# Patient Record
Sex: FEMALE | Race: OTHER
[De-identification: names, ages, dates, MRNs, and addresses within clinical notes are randomized per-mention and may not be internally consistent; named-entity substitution may affect disease eponyms.]

---

## 2020-10-10 ENCOUNTER — HOSPITAL ENCOUNTER (INPATIENT)
Dept: HOSPITAL 1 - ED | Age: 35
LOS: 4 days | Discharge: HOME | DRG: 263 | End: 2020-10-14
Attending: HOSPITALIST | Admitting: HOSPITALIST
Payer: COMMERCIAL

## 2020-10-10 VITALS
HEIGHT: 62 IN | WEIGHT: 230 LBS | HEIGHT: 62 IN | BODY MASS INDEX: 42.33 KG/M2 | BODY MASS INDEX: 42.33 KG/M2 | WEIGHT: 230 LBS

## 2020-10-10 DIAGNOSIS — Z98.891: ICD-10-CM

## 2020-10-10 DIAGNOSIS — Z20.828: ICD-10-CM

## 2020-10-10 DIAGNOSIS — E66.9: ICD-10-CM

## 2020-10-10 DIAGNOSIS — K80.00: Primary | ICD-10-CM

## 2020-10-10 LAB
ALBUMIN SERPL-MCNC: 3.3 G/DL (ref 3.4–5)
ALP SERPL-CCNC: 68 U/L (ref 46–116)
ALT SERPL-CCNC: 26 U/L (ref 14–59)
AST SERPL-CCNC: 12 U/L (ref 15–37)
BASOPHILS NFR BLD: 0.4 % (ref 0–2)
BILIRUB SERPL-MCNC: 0.17 MG/DL (ref 0.2–1)
BUN SERPL-MCNC: 13 MG/DL (ref 7–18)
CALCIUM SERPL-MCNC: 8.6 MG/DL (ref 8.5–10.1)
CHLORIDE SERPL-SCNC: 103 MMOL/L (ref 98–107)
CO2 SERPL-SCNC: 26.4 MMOL/L (ref 21–32)
CREAT SERPL-MCNC: 0.7 MG/DL (ref 0.6–1)
ERYTHROCYTE [DISTWIDTH] IN BLOOD BY AUTOMATED COUNT: 13 % (ref 11.5–14.5)
GFR SERPLBLD BASED ON 1.73 SQ M-ARVRAT: > 60 ML/MIN
GLUCOSE SERPL-MCNC: 111 MG/DL (ref 74–106)
LIPASE SERPL-CCNC: 136 IU/L (ref 73–393)
PLATELET # BLD: 235 X10^3MCL (ref 130–400)
POTASSIUM SERPL-SCNC: 4.2 MMOL/L (ref 3.5–5.1)
PROT SERPL-MCNC: 6.8 G/DL (ref 6.4–8.2)
SODIUM SERPL-SCNC: 137 MMOL/L (ref 136–145)

## 2020-10-10 PROCEDURE — A9537 TC99M MEBROFENIN: HCPCS

## 2020-10-10 PROCEDURE — G0378 HOSPITAL OBSERVATION PER HR: HCPCS

## 2020-10-10 NOTE — NUR
PT LAYING SUPINE IN GURNEY IN A POSITION OF COMFORT. IV PLACED IN RAC.
ULTRASOUND AT BEDSIDE. PT AAO X4 RESPIRATIONS E/U NO DISTRESS NOTED.

## 2020-10-10 NOTE — NUR
PT CAME IN WITH A C/C OF RIGHT UPPER QUADRANT PAIN X4 DAYS. PT REPORTS SHE WAS
DIAGNOSED WITH GALL STONES APROX 4 YEARS AGO. PT STATES SHE REFUSED SURGERY
THAT WAS RECOMMENDED AND STATES SHE HAS BEEN TRYING TO MANAGE IN OTHER WAYS. PT
STATES SHE IS USUALLY ABLE TO MANAGE [employer] BUT HAS NOT BEEN ABLE TO THIS
TIME. PT STATES NAUSEA BUT NO VOMITING. PT STATES NO DIARRHEA OR CONSTIPATION.
PT STATES NO PAIN UPON PALPATION. PT AAO X4 RESPIRATIONS E/U NO DISTRESS NOTED.

## 2020-10-11 VITALS — SYSTOLIC BLOOD PRESSURE: 110 MMHG | DIASTOLIC BLOOD PRESSURE: 56 MMHG

## 2020-10-11 VITALS — SYSTOLIC BLOOD PRESSURE: 111 MMHG | DIASTOLIC BLOOD PRESSURE: 70 MMHG

## 2020-10-11 VITALS — DIASTOLIC BLOOD PRESSURE: 48 MMHG | SYSTOLIC BLOOD PRESSURE: 107 MMHG

## 2020-10-11 VITALS — SYSTOLIC BLOOD PRESSURE: 133 MMHG | DIASTOLIC BLOOD PRESSURE: 76 MMHG

## 2020-10-11 VITALS — SYSTOLIC BLOOD PRESSURE: 127 MMHG | DIASTOLIC BLOOD PRESSURE: 48 MMHG

## 2020-10-11 VITALS — DIASTOLIC BLOOD PRESSURE: 49 MMHG | SYSTOLIC BLOOD PRESSURE: 112 MMHG

## 2020-10-11 LAB
AMYLASE SERPL-CCNC: 58 U/L (ref 25–115)
BASOPHILS NFR BLD: 0.5 % (ref 0–2)
BUN SERPL-MCNC: 10 MG/DL (ref 7–18)
CALCIUM SERPL-MCNC: 8.5 MG/DL (ref 8.5–10.1)
CHLORIDE SERPL-SCNC: 105 MMOL/L (ref 98–107)
CHOLEST SERPL-MCNC: 159 MG/DL (ref ?–200)
CHOLEST/HDLC SERPL: 3.8 MG/DL
CO2 SERPL-SCNC: 24.3 MMOL/L (ref 21–32)
CREAT SERPL-MCNC: 0.6 MG/DL (ref 0.6–1)
ERYTHROCYTE [DISTWIDTH] IN BLOOD BY AUTOMATED COUNT: 13.4 % (ref 11.5–14.5)
GFR SERPLBLD BASED ON 1.73 SQ M-ARVRAT: > 60 ML/MIN
GLUCOSE SERPL-MCNC: 90 MG/DL (ref 74–106)
HDLC SERPL-MCNC: 42 MG/DL (ref 40–60)
MAGNESIUM SERPL-MCNC: 1.9 MG/DL (ref 1.8–2.4)
MAGNESIUM SERPL-MCNC: 2 MG/DL (ref 1.8–2.4)
PHOSPHATE SERPL-MCNC: 3.6 MG/DL (ref 2.5–4.9)
PHOSPHATE SERPL-MCNC: 4.1 MG/DL (ref 2.5–4.9)
PLATELET # BLD: 217 X10^3MCL (ref 130–400)
POTASSIUM SERPL-SCNC: 4.1 MMOL/L (ref 3.5–5.1)
SODIUM SERPL-SCNC: 137 MMOL/L (ref 136–145)
T3 SERPL-MCNC: 0.8 NG/ML
T3RU NFR SERPL: 33 % UPTAKE (ref 30–39)
T4 FREE SERPL-MCNC: 1.06 NG/DL (ref 0.76–1.46)
T4 SERPL-MCNC: 8.5 UG/DL (ref 4.7–13.3)
T4/T3 UPTAKE INDEX SERPL: 2.8 UG/DL (ref 1.4–4.5)
TRIGL SERPL-MCNC: 133 MG/DL (ref ?–150)

## 2020-10-11 NOTE — NUR
PATIENT HAS BEEN NPO, CALLED NM AND PATIENT IS SCHEDULE FOR HIDA SCAN AT ABOUT
[id number], HCG BLOOD TEST STAT ORDERED, WAITING FOR RESULTS. PATIENT DENIES
ANY ABDOMINAL PAIN AT THIS TIME. CONTINUE IV  NS 100ML/HR. WILL CONTINUE TO
MONITOR PATIENT.

## 2020-10-11 NOTE — NUR
A/A/O X4. DENIES DIZZINESS C/O HEADACHE RADIATING TO THE JAW. DENIES NEED FOR
PAIN MEDICATION THUS FAR. BREATH SOUNDS CLEAR. BREATHING EVEN AND UNLABORED ON
ROOM AIR. DENIES CHEST PAIN AND PRESSURE. BOWEL SOUNDS ACTIVE. NO C/O N/V AND
ABD PAIN THUS FAR. IV INTACT ON THE RAC INFUSING WITH NS  ML/HR. MADE PT
COMFORTABLE. PLACED CALL LIGHT WITH IN REACH. WILL CONTINUE TO MONITOR.

## 2020-10-11 NOTE — NUR
PT AAO X4 RESPIRATIONS E/U NO DISTRESS NOTED. PT SEEN BY ADMITTING MD WHO SPOKE
WITH PT ABOUT POC. PT REPORTS PAIN IS BETTER JUST "ANNOYING."

## 2020-10-11 NOTE — NUR
PATIENT CAME BACK FROM HIDA SCAN AT 1740. CONTINUING IV NS INFUSIONG. PATIENT
WANTED TO EAT. PAGED DR. ALBERT AND PATIENT STILL NEEDS TO BE NPO.
EXPLAINS TO PATIENT ABOUT THE REASONS, BUT PATIENT
IS STILL NOT TOO HAPPY ABOUT IT.

## 2020-10-12 VITALS — SYSTOLIC BLOOD PRESSURE: 123 MMHG | DIASTOLIC BLOOD PRESSURE: 57 MMHG

## 2020-10-12 VITALS — SYSTOLIC BLOOD PRESSURE: 97 MMHG | DIASTOLIC BLOOD PRESSURE: 41 MMHG

## 2020-10-12 VITALS — SYSTOLIC BLOOD PRESSURE: 113 MMHG | DIASTOLIC BLOOD PRESSURE: 65 MMHG

## 2020-10-12 VITALS — DIASTOLIC BLOOD PRESSURE: 47 MMHG | SYSTOLIC BLOOD PRESSURE: 96 MMHG

## 2020-10-12 VITALS — SYSTOLIC BLOOD PRESSURE: 101 MMHG | DIASTOLIC BLOOD PRESSURE: 53 MMHG

## 2020-10-12 LAB
BASOPHILS NFR BLD: 0.4 % (ref 0–2)
BUN SERPL-MCNC: 8 MG/DL (ref 7–18)
CALCIUM SERPL-MCNC: 7.6 MG/DL (ref 8.5–10.1)
CHLORIDE SERPL-SCNC: 108 MMOL/L (ref 98–107)
CO2 SERPL-SCNC: 24.3 MMOL/L (ref 21–32)
CREAT SERPL-MCNC: 0.6 MG/DL (ref 0.6–1)
ERYTHROCYTE [DISTWIDTH] IN BLOOD BY AUTOMATED COUNT: 13.4 % (ref 11.5–14.5)
GFR SERPLBLD BASED ON 1.73 SQ M-ARVRAT: > 60 ML/MIN
GLUCOSE SERPL-MCNC: 84 MG/DL (ref 74–106)
MAGNESIUM SERPL-MCNC: 2.1 MG/DL (ref 1.8–2.4)
PHOSPHATE SERPL-MCNC: 3.2 MG/DL (ref 2.5–4.9)
PLATELET # BLD: 209 X10^3MCL (ref 130–400)
POTASSIUM SERPL-SCNC: 3.9 MMOL/L (ref 3.5–5.1)
SODIUM SERPL-SCNC: 140 MMOL/L (ref 136–145)

## 2020-10-12 NOTE — NUR
AOX4
DENIES PAIN/DISCOMFORT AT THIS TIME
LAYING IN BED COMFORTABLY
CALL LIGHT WITHIN REACH
WILL CONTINUE TO MONITOR PT

## 2020-10-12 NOTE — NUR
PT C/O HAVING PROBLEM SLEEPING. DR. NOVA NOTIFIED, DOCTOR ORDERED AMBIEN PO.
CARRIED OUT ORDER. GAVE PT AMBIEN PO. PT TOLERATED IT WELL. WILL CONTINUE TO
MONITOR.

## 2020-10-12 NOTE — NUR
PATIENT WAS NPO FOR THE MORNING AND AS SOON AS PATIENT FOUND OUT SHE WAS NOT
GOING TO HAVE SURGERY TODAY, PATIENT STARTED TO EAT FULL LIQUID DIET AT NOON.
PATIENT TOLERATED THE MEALS WELL, DENIES ANY STOMACH PAIN AND REFUSED ANY
NARCOTIC. LAP DEEDEE PROCEDURE CONSENT FORM SIGNED. CONTINUING IV NS INFUSION
100 ML/HR. WILL CONTINUE TO MONITOR PATIENT.

## 2020-10-12 NOTE — NUR
PT QUIET AND RESTING. NO C/O ABDOMINAL PAIN THUS FAR. IV INTACT AND INFUSING
AS ORDERED. MADE PT COMOFRTABLE. WILL ENDORSE TO THE AM NURSE ACCORDINGLY.

## 2020-10-12 NOTE — NUR
CALLED OR AND PATIENT WAS SCHEDULED FOR LAP DEEDEE TOMORROW AT 0830. PATIENT
UPDATED ON THE PLAN OF CARE. DIET CHANGED TO FULL LIQUID NOW, WILL KEEP NPO
AFTER MIDNIGHT.

## 2020-10-13 VITALS — SYSTOLIC BLOOD PRESSURE: 112 MMHG | DIASTOLIC BLOOD PRESSURE: 65 MMHG

## 2020-10-13 VITALS — SYSTOLIC BLOOD PRESSURE: 124 MMHG | DIASTOLIC BLOOD PRESSURE: 68 MMHG

## 2020-10-13 VITALS — DIASTOLIC BLOOD PRESSURE: 65 MMHG | SYSTOLIC BLOOD PRESSURE: 109 MMHG

## 2020-10-13 VITALS — SYSTOLIC BLOOD PRESSURE: 111 MMHG | DIASTOLIC BLOOD PRESSURE: 64 MMHG

## 2020-10-13 VITALS — DIASTOLIC BLOOD PRESSURE: 71 MMHG | SYSTOLIC BLOOD PRESSURE: 119 MMHG

## 2020-10-13 LAB
BASOPHILS NFR BLD: 0.4 % (ref 0–2)
BUN SERPL-MCNC: 5 MG/DL (ref 7–18)
CALCIUM SERPL-MCNC: 8.7 MG/DL (ref 8.5–10.1)
CHLORIDE SERPL-SCNC: 108 MMOL/L (ref 98–107)
CO2 SERPL-SCNC: 25.5 MMOL/L (ref 21–32)
CREAT SERPL-MCNC: 0.7 MG/DL (ref 0.6–1)
ERYTHROCYTE [DISTWIDTH] IN BLOOD BY AUTOMATED COUNT: 13.2 % (ref 11.5–14.5)
GFR SERPLBLD BASED ON 1.73 SQ M-ARVRAT: > 60 ML/MIN
GLUCOSE SERPL-MCNC: 92 MG/DL (ref 74–106)
PLATELET # BLD: 220 X10^3MCL (ref 130–400)
POTASSIUM SERPL-SCNC: 4.3 MMOL/L (ref 3.5–5.1)
SODIUM SERPL-SCNC: 140 MMOL/L (ref 136–145)

## 2020-10-13 PROCEDURE — 0FT44ZZ RESECTION OF GALLBLADDER, PERCUTANEOUS ENDOSCOPIC APPROACH: ICD-10-PCS | Performed by: SURGERY

## 2020-10-13 NOTE — NUR
PATIENT ALERT AND ORIENTED X4. PATIENT IS IN STABLE CONDITION. PATIENT SENT TO
OR. CONSENTS SIGNED. ID BAND VERIFIED. REPORT GIVE TO OR NURSE.

## 2020-10-13 NOTE — NUR
PATIENT IN BED RESTING. PATIENT IS ALERT AND ORIENTED X4. PATIENT DENIES
DISTRESS AND SHORTNESS OF BREATH. PATIENT WILL RECEIEVE NEXT DOSAGE OF PAIN
MEDICATION DURING NIGHT SHIFT. ALL CARE NEEDS MET. HOMEOSTASIS MAINTAINED AT
PATIENTS BASELINE. BED IN LOW POSITION. CALL LIGHT IN REACH. WILL GIVE REPORT
TO NIGHT NURSE.

## 2020-10-13 NOTE — NUR
PT AOX4
LAYING IN BED
PT COMPLAINING OF PAIN, PRN PAIN MED GIVEN TO PT
PT HAS NO IV ACCESS, PT DOES NOT WANT A NEW IV, PT STATES SHE IS DRINKING
FLUID AND DOES NOT WANT TO BE CONNECTED TO IV FLUIDS. ATTENDING PAGED, WAITING
FOR CALL BACK
3 ABDOMINAL SITES WITH BANDAID C/D/I. LAKSHMI DRAIN C/D/I, 40 ML OUTPUT
CALL LIGHT WITHIN REACH
WILL CONTINUE TO MONITOR [place]

## 2020-10-13 NOTE — NUR
PT SLEPT WELL THROUGHOUT THE NIGHT
PRN PAIN MED GIVEN X1
NO OTHER  COMPLAINTS AT THIS TIME
VSS
CALL LIGHT WITHIN REACH
WILL ENDORSE TO ONCOMING RN

## 2020-10-14 VITALS — DIASTOLIC BLOOD PRESSURE: 75 MMHG | SYSTOLIC BLOOD PRESSURE: 115 MMHG

## 2020-10-14 VITALS — DIASTOLIC BLOOD PRESSURE: 64 MMHG | SYSTOLIC BLOOD PRESSURE: 111 MMHG

## 2020-10-14 VITALS — SYSTOLIC BLOOD PRESSURE: 110 MMHG | DIASTOLIC BLOOD PRESSURE: 64 MMHG

## 2020-10-14 LAB
ALBUMIN SERPL-MCNC: 3.8 G/DL (ref 3.4–5)
ALP SERPL-CCNC: 53 U/L (ref 46–116)
ALT SERPL-CCNC: 63 U/L (ref 14–59)
AST SERPL-CCNC: 53 U/L (ref 15–37)
BASOPHILS NFR BLD: 0.3 % (ref 0–2)
BILIRUB SERPL-MCNC: 0.51 MG/DL (ref 0.2–1)
BUN SERPL-MCNC: 6 MG/DL (ref 7–18)
CALCIUM SERPL-MCNC: 8.9 MG/DL (ref 8.5–10.1)
CHLORIDE SERPL-SCNC: 103 MMOL/L (ref 98–107)
CO2 SERPL-SCNC: 29.2 MMOL/L (ref 21–32)
CREAT SERPL-MCNC: 0.7 MG/DL (ref 0.6–1)
ERYTHROCYTE [DISTWIDTH] IN BLOOD BY AUTOMATED COUNT: 13.1 % (ref 11.5–14.5)
GFR SERPLBLD BASED ON 1.73 SQ M-ARVRAT: > 60 ML/MIN
GLUCOSE SERPL-MCNC: 93 MG/DL (ref 74–106)
PLATELET # BLD: 281 X10^3MCL (ref 130–400)
POTASSIUM SERPL-SCNC: 3.5 MMOL/L (ref 3.5–5.1)
PROT SERPL-MCNC: 7.9 G/DL (ref 6.4–8.2)
SODIUM SERPL-SCNC: 139 MMOL/L (ref 136–145)

## 2020-10-14 NOTE — NUR
PATIENT DISCHARGED. LAKSHMI DRAIN OUT. IV OUT. DISCHARGE TEACHING DONE. DISCHARGE
PAPERWORK AND BELONGINGS SENT HOME WITH PATIENT. PATIENT ALERT, ORIENTED, AND
CAPABLE OF DRIVING SELF HOME.

## 2020-10-14 NOTE — NUR
SLEPT WELL THROUGHOUT THE NIGHT
PRN PAN MED GIVE X2 WITH GOOD EFFECT
DRESSING TO ABDOMEN CDI
LAKSHMI DRAIN OUTPUT 25 ML SANGUINOUS
NO OTHER COMPLAINTS AT THIS TIME
WILL ENDORSE TO ONCOMING RN

## 2020-10-14 NOTE — NUR
PATIENT IS IN BED RESTING. PATIENT IS ALERT AND ORIENTED X4. PATIENT DENIES
PAIN, DISTRESS, OR SHORTNESS OF BREATH. AM CARE NEEDS MET. PATIENT REQUESTED
TO ADVANCE HER DIET TO REGULAR DIET. HOMEOSTASIS MAINTAINED AT BASELINE. BED
IN LOW POSITION. CALL LIGHT IN REACH. WILL CONTINUE TO MONITOR PATIENT
THROUGHOUT SHIFT.